# Patient Record
Sex: FEMALE | Race: ASIAN | NOT HISPANIC OR LATINO | URBAN - METROPOLITAN AREA
[De-identification: names, ages, dates, MRNs, and addresses within clinical notes are randomized per-mention and may not be internally consistent; named-entity substitution may affect disease eponyms.]

---

## 2023-08-21 VITALS
OXYGEN SATURATION: 100 % | WEIGHT: 110.01 LBS | SYSTOLIC BLOOD PRESSURE: 163 MMHG | HEART RATE: 63 BPM | TEMPERATURE: 97 F | RESPIRATION RATE: 16 BRPM | DIASTOLIC BLOOD PRESSURE: 92 MMHG | HEIGHT: 59 IN

## 2023-08-21 RX ORDER — CHLORHEXIDINE GLUCONATE 213 G/1000ML
1 SOLUTION TOPICAL ONCE
Refills: 0 | Status: DISCONTINUED | OUTPATIENT
Start: 2023-08-28 | End: 2023-08-29

## 2023-08-21 NOTE — H&P ADULT - ASSESSMENT
74 y/o Mandarin speaking  female with PMH of HTN, HLD , b/l varicose veins, arthritis, sciatica who presents to see her cardiologist Dr. Jesus with CC of substernal, non-radiating CP described as pressure like which has been gradually worsening occurring intermittently for the past few weeks occuring on ambulating less than 2 blocks or climbing greater than 1 flight of stairs which is worse with exertion and relieved with rest.  Pt also with gradually worsening dizziness; reports several episodes of near syncope. Patient now presents to [facility] for possible cardiac cath with possible intervention if clinically in light of patients risk factors, CCS class II/III sx, and abnormal NST.     ASA II          Mallampati I    Patient is a candidate for sedation: yes  Sedation: Moderate    Risks & benefits of procedure and alternative therapy have been explained to the patient including but not limited to: allergic reaction, bleeding w/possible need for blood transfusion, infection, renal and vascular compromise, limb damage, arrhythmia, stroke, vessel dissection/perforation, Myocardial infarction, emergent CABG. Informed consent obtained and in chart.   Consent obtained with Mandarin  Language Line #[id number] and children at bedside.     Patient denies bleeding, BRBPR, melena, hematuria, hematemesis.    [ ] Load w/ Plavix 600mg POx1, patient compliant with ASA 81mg and took dose this AM prior to arrival   - H/H 12.5/38.3 Plt 224   [ ] Hydrate with  cc Bolus IV x 1 followed by NS 75 cc/hr x 2 hrs per Hydration Protocol   - BUN/Cr 11/0.47   - Patient euvolemic on exam. EF 60%, mild AI/TR mod AR- will caution and give 250cc bolus over 1hour   [ ] K 4.4 Mg 2.1   - lytes stable   [ ] Patient allergic to lidocaine, unable to verify reaction. Fellow made aware and will proceed with bupivacaine; ordered on call to cath lab

## 2023-08-21 NOTE — H&P ADULT - NSICDXPASTMEDICALHX_GEN_ALL_CORE_FT
PAST MEDICAL HISTORY:  HLD (hyperlipidemia)     HTN (hypertension)     Sciatica     Varicose veins

## 2023-08-21 NOTE — H&P ADULT - HISTORY OF PRESENT ILLNESS
Cardiologist: Dr. Jesus  Pharmacy:  Escort:    propofol allergy (severe __ confirm reaction)    72 y/o M mandarin Speaking with PMH of HTN, HLD , b/l varicose veins, arthritis, sciatica who presents to see his cardiologist Dr. Jesus with CC of substernal, non-radiating CP described as pressure like which has been gradually worsening occurring intermittently for the past few weeks occuring on ambulating less than 2 blocks or climbing greater than 1 flight of stairs which is worse with exertion and relieved with rest.  Pt also with gradually worsening dizziness; reports several episodes of near syncope. Pt also with b/l moderate leg pain described as heaviness/weakness occuring on ambulating less than 1 block which is improved with rest and avoidance of prolonged standing.  Pt. denies any ___ SOB,  palpitation, N/V, LE edema, PND/orthopnea.  NST 6/23/23 revealed mod. perfusion abnormality of mod. intensity os present in lateral region on stress image which was reversible in rest images, EF 70 %,  ECHO 6/16/23 revealed EF 60 % , mild AI. mod MR, mild TR, left to right shunt noted across the interatrial septum suggestive of trace patent foramen ovale.     In light of patients risk factors, CCS class __ sx and abnormal NST, pt, is now referred for cardiac cath with possible intervention.  Cardiologist: Dr. Jesus  Pharmacy: [facility] ([phone])  Escort: Children     72 y/o Mandarin speaking female with PMH of HTN, HLD , b/l varicose veins, arthritis, sciatica who presents to see her cardiologist Dr. Jesus with CC of substernal, non-radiating CP described as pressure like which has been gradually worsening occurring intermittently for the past few weeks occuring on ambulating less than 2 blocks or climbing greater than 1 flight of stairs which is worse with exertion and relieved with rest.  Pt also with gradually worsening dizziness; reports several episodes of near syncope. Pt also with b/l moderate leg pain described as heaviness/weakness occuring on ambulating less than 1 block which is improved with rest and avoidance of prolonged standing.  Pt endorses intermittent SOB, palpitations, and diarrhea. Otherwise denies abdominal pain, N/V, LE edema, PND/orthopnea, hematuria, BRBPR, or melena at this time. NST 6/23/23 revealed mod. perfusion abnormality of mod. intensity os present in lateral region on stress image which was reversible in rest images, EF 70 %,  ECHO 6/16/23 revealed EF 60 % , mild AI. mod MR, mild TR, left to right shunt noted across the interatrial septum suggestive of trace patent foramen ovale.     Of note, per MD records patient allergic to  propofol- patient states after colonoscopy it took a long time to wake up and she was shaking her head, denies rash, itchiness, or SOB. Per outpatient MD notes patient also allergic to lidocaine unknown reaction     In light of patients risk factors, CCS class II/III sx and abnormal NST, pt, is now referred for cardiac cath with possible intervention if clinically indicated.   Cardiologist: Dr. Jesus  Pharmacy: [facility] ([phone])  Med Rec done with pharmacy- patient prescribed Atorva 20mg qhs and Vascepa 1g 2caps bid however patient is not taking.   Escort: Children     74 y/o Mandarin speaking female with PMH of HTN, HLD , b/l varicose veins, arthritis, sciatica who presents to see her cardiologist Dr. Jesus with CC of substernal, non-radiating CP described as pressure like which has been gradually worsening occurring intermittently for the past few weeks occuring on ambulating less than 2 blocks or climbing greater than 1 flight of stairs which is worse with exertion and relieved with rest.  Pt also with gradually worsening dizziness; reports several episodes of near syncope. Pt also with b/l moderate leg pain described as heaviness/weakness occuring on ambulating less than 1 block which is improved with rest and avoidance of prolonged standing.  Pt endorses intermittent SOB, palpitations, and diarrhea. Otherwise denies abdominal pain, N/V, LE edema, PND/orthopnea, hematuria, BRBPR, or melena at this time. NST 6/23/23 revealed mod. perfusion abnormality of mod. intensity os present in lateral region on stress image which was reversible in rest images, EF 70 %,  ECHO 6/16/23 revealed EF 60 % , mild AI. mod MR, mild TR, left to right shunt noted across the interatrial septum suggestive of trace patent foramen ovale.     Of note, per MD records patient allergic to  propofol- patient states after colonoscopy it took a long time to wake up and she was shaking her head, denies rash, itchiness, or SOB. Per outpatient MD notes patient also allergic to lidocaine unknown reaction     In light of patients risk factors, CCS class II/III sx and abnormal NST, pt, is now referred for cardiac cath with possible intervention if clinically indicated.

## 2023-08-21 NOTE — H&P ADULT - NSHPLABSRESULTS_GEN_ALL_CORE
12.5   4.76  )-----------( 224      ( 28 Aug 2023 12:26 )             38.3       08-28    145  |  108  |  11  ----------------------------<  94  4.4   |  28  |  0.47<L>    Ca    9.1      28 Aug 2023 12:32  Mg     2.1     08-28    TPro  7.6  /  Alb  4.5  /  TBili  0.3  /  DBili  x   /  AST  25  /  ALT  27  /  AlkPhos  52  08-28      PT/INR - ( 28 Aug 2023 12:26 )   PT: 10.7 sec;   INR: 0.94          PTT - ( 28 Aug 2023 12:26 )  PTT:29.5 sec        EKG 08/11/23: 63 BPM NSR no acute ischemic changes

## 2023-08-28 ENCOUNTER — INPATIENT (INPATIENT)
Facility: HOSPITAL | Age: 73
LOS: 0 days | Discharge: HOME | DRG: 287 | End: 2023-08-29
Attending: INTERNAL MEDICINE | Admitting: INTERNAL MEDICINE
Payer: MEDICARE

## 2023-08-28 LAB
A1C WITH ESTIMATED AVERAGE GLUCOSE RESULT: 5.7 % — HIGH (ref 4–5.6)
ALBUMIN SERPL ELPH-MCNC: 4.1 G/DL — SIGNIFICANT CHANGE UP (ref 3.3–5)
ALBUMIN SERPL ELPH-MCNC: 4.5 G/DL — SIGNIFICANT CHANGE UP (ref 3.3–5)
ALP SERPL-CCNC: 48 U/L — SIGNIFICANT CHANGE UP (ref 40–120)
ALP SERPL-CCNC: 52 U/L — SIGNIFICANT CHANGE UP (ref 40–120)
ALT FLD-CCNC: 26 U/L — SIGNIFICANT CHANGE UP (ref 10–45)
ALT FLD-CCNC: 27 U/L — SIGNIFICANT CHANGE UP (ref 10–45)
ANION GAP SERPL CALC-SCNC: 10 MMOL/L — SIGNIFICANT CHANGE UP (ref 5–17)
ANION GAP SERPL CALC-SCNC: 9 MMOL/L — SIGNIFICANT CHANGE UP (ref 5–17)
APTT BLD: 100.5 SEC — HIGH (ref 24.5–35.6)
APTT BLD: 29.5 SEC — SIGNIFICANT CHANGE UP (ref 24.5–35.6)
AST SERPL-CCNC: 23 U/L — SIGNIFICANT CHANGE UP (ref 10–40)
AST SERPL-CCNC: 25 U/L — SIGNIFICANT CHANGE UP (ref 10–40)
BASE EXCESS BLDV CALC-SCNC: 3.5 MMOL/L — HIGH (ref -2–3)
BASOPHILS # BLD AUTO: 0.01 K/UL — SIGNIFICANT CHANGE UP (ref 0–0.2)
BASOPHILS # BLD AUTO: 0.02 K/UL — SIGNIFICANT CHANGE UP (ref 0–0.2)
BASOPHILS NFR BLD AUTO: 0.2 % — SIGNIFICANT CHANGE UP (ref 0–2)
BASOPHILS NFR BLD AUTO: 0.4 % — SIGNIFICANT CHANGE UP (ref 0–2)
BILIRUB SERPL-MCNC: 0.3 MG/DL — SIGNIFICANT CHANGE UP (ref 0.2–1.2)
BILIRUB SERPL-MCNC: 0.4 MG/DL — SIGNIFICANT CHANGE UP (ref 0.2–1.2)
BLOOD GAS VENOUS - BLOOD UREA NITROGEN: 12 MG/DL — SIGNIFICANT CHANGE UP (ref 7–23)
BLOOD GAS VENOUS - CREATININE: 0.5 MG/DL — SIGNIFICANT CHANGE UP (ref 0.5–1.3)
BUN SERPL-MCNC: 10 MG/DL — SIGNIFICANT CHANGE UP (ref 7–23)
BUN SERPL-MCNC: 11 MG/DL — SIGNIFICANT CHANGE UP (ref 7–23)
CA-I SERPL-SCNC: 1.23 MMOL/L — SIGNIFICANT CHANGE UP (ref 1.15–1.33)
CALCIUM SERPL-MCNC: 9 MG/DL — SIGNIFICANT CHANGE UP (ref 8.4–10.5)
CALCIUM SERPL-MCNC: 9.1 MG/DL — SIGNIFICANT CHANGE UP (ref 8.4–10.5)
CHLORIDE SERPL-SCNC: 108 MMOL/L — SIGNIFICANT CHANGE UP (ref 96–108)
CHLORIDE SERPL-SCNC: 108 MMOL/L — SIGNIFICANT CHANGE UP (ref 96–108)
CHOLEST SERPL-MCNC: 166 MG/DL — SIGNIFICANT CHANGE UP
CO2 BLDV-SCNC: 29.9 MMOL/L — HIGH (ref 22–26)
CO2 SERPL-SCNC: 24 MMOL/L — SIGNIFICANT CHANGE UP (ref 22–31)
CO2 SERPL-SCNC: 28 MMOL/L — SIGNIFICANT CHANGE UP (ref 22–31)
COHGB MFR BLDV: 0 % — SIGNIFICANT CHANGE UP
CREAT SERPL-MCNC: 0.44 MG/DL — LOW (ref 0.5–1.3)
CREAT SERPL-MCNC: 0.47 MG/DL — LOW (ref 0.5–1.3)
EGFR: 102 ML/MIN/1.73M2 — SIGNIFICANT CHANGE UP
EGFR: 103 ML/MIN/1.73M2 — SIGNIFICANT CHANGE UP
EOSINOPHIL # BLD AUTO: 0.02 K/UL — SIGNIFICANT CHANGE UP (ref 0–0.5)
EOSINOPHIL # BLD AUTO: 0.03 K/UL — SIGNIFICANT CHANGE UP (ref 0–0.5)
EOSINOPHIL NFR BLD AUTO: 0.4 % — SIGNIFICANT CHANGE UP (ref 0–6)
EOSINOPHIL NFR BLD AUTO: 0.6 % — SIGNIFICANT CHANGE UP (ref 0–6)
ESTIMATED AVERAGE GLUCOSE: 117 MG/DL — HIGH (ref 68–114)
GAS PNL BLDV: 140 MMOL/L — SIGNIFICANT CHANGE UP (ref 136–145)
GLUCOSE BLDC GLUCOMTR-MCNC: 89 MG/DL — SIGNIFICANT CHANGE UP (ref 70–99)
GLUCOSE BLDC GLUCOMTR-MCNC: 93 MG/DL — SIGNIFICANT CHANGE UP (ref 70–99)
GLUCOSE BLDV-MCNC: 92 MG/DL — SIGNIFICANT CHANGE UP (ref 70–99)
GLUCOSE SERPL-MCNC: 104 MG/DL — HIGH (ref 70–99)
GLUCOSE SERPL-MCNC: 94 MG/DL — SIGNIFICANT CHANGE UP (ref 70–99)
HCO3 BLDV-SCNC: 28 MMOL/L — SIGNIFICANT CHANGE UP (ref 22–29)
HCT VFR BLD CALC: 35.6 % — SIGNIFICANT CHANGE UP (ref 34.5–45)
HCT VFR BLD CALC: 38.3 % — SIGNIFICANT CHANGE UP (ref 34.5–45)
HCT VFR BLDA CALC: 38 % — SIGNIFICANT CHANGE UP
HCT VFR BLDA CALC: 38 % — SIGNIFICANT CHANGE UP
HDLC SERPL-MCNC: 59 MG/DL — SIGNIFICANT CHANGE UP
HGB BLD CALC-MCNC: 12.6 G/DL — SIGNIFICANT CHANGE UP (ref 11.7–16.1)
HGB BLD-MCNC: 11.8 G/DL — SIGNIFICANT CHANGE UP (ref 11.5–15.5)
HGB BLD-MCNC: 12.5 G/DL — SIGNIFICANT CHANGE UP (ref 11.5–15.5)
IMM GRANULOCYTES NFR BLD AUTO: 0.2 % — SIGNIFICANT CHANGE UP (ref 0–0.9)
IMM GRANULOCYTES NFR BLD AUTO: 0.4 % — SIGNIFICANT CHANGE UP (ref 0–0.9)
INR BLD: 0.94 — SIGNIFICANT CHANGE UP (ref 0.85–1.18)
INR BLD: 0.96 — SIGNIFICANT CHANGE UP (ref 0.85–1.18)
ISTAT INR: 1 — SIGNIFICANT CHANGE UP (ref 0.88–1.16)
ISTAT PT: 11.5 SEC — SIGNIFICANT CHANGE UP (ref 10–12.9)
LACTATE SERPL-SCNC: 1 MMOL/L — SIGNIFICANT CHANGE UP (ref 0.5–2)
LIPID PNL WITH DIRECT LDL SERPL: 87 MG/DL — SIGNIFICANT CHANGE UP
LYMPHOCYTES # BLD AUTO: 1.67 K/UL — SIGNIFICANT CHANGE UP (ref 1–3.3)
LYMPHOCYTES # BLD AUTO: 1.79 K/UL — SIGNIFICANT CHANGE UP (ref 1–3.3)
LYMPHOCYTES # BLD AUTO: 33 % — SIGNIFICANT CHANGE UP (ref 13–44)
LYMPHOCYTES # BLD AUTO: 37.6 % — SIGNIFICANT CHANGE UP (ref 13–44)
MAGNESIUM SERPL-MCNC: 2 MG/DL — SIGNIFICANT CHANGE UP (ref 1.6–2.6)
MAGNESIUM SERPL-MCNC: 2.1 MG/DL — SIGNIFICANT CHANGE UP (ref 1.6–2.6)
MCHC RBC-ENTMCNC: 31.7 PG — SIGNIFICANT CHANGE UP (ref 27–34)
MCHC RBC-ENTMCNC: 31.8 PG — SIGNIFICANT CHANGE UP (ref 27–34)
MCHC RBC-ENTMCNC: 32.6 GM/DL — SIGNIFICANT CHANGE UP (ref 32–36)
MCHC RBC-ENTMCNC: 33.1 GM/DL — SIGNIFICANT CHANGE UP (ref 32–36)
MCV RBC AUTO: 96 FL — SIGNIFICANT CHANGE UP (ref 80–100)
MCV RBC AUTO: 97.2 FL — SIGNIFICANT CHANGE UP (ref 80–100)
METHGB MFR BLDV: 0.3 % — SIGNIFICANT CHANGE UP
MONOCYTES # BLD AUTO: 0.33 K/UL — SIGNIFICANT CHANGE UP (ref 0–0.9)
MONOCYTES # BLD AUTO: 0.4 K/UL — SIGNIFICANT CHANGE UP (ref 0–0.9)
MONOCYTES NFR BLD AUTO: 6.5 % — SIGNIFICANT CHANGE UP (ref 2–14)
MONOCYTES NFR BLD AUTO: 8.4 % — SIGNIFICANT CHANGE UP (ref 2–14)
NEUTROPHILS # BLD AUTO: 2.51 K/UL — SIGNIFICANT CHANGE UP (ref 1.8–7.4)
NEUTROPHILS # BLD AUTO: 3.01 K/UL — SIGNIFICANT CHANGE UP (ref 1.8–7.4)
NEUTROPHILS NFR BLD AUTO: 52.8 % — SIGNIFICANT CHANGE UP (ref 43–77)
NEUTROPHILS NFR BLD AUTO: 59.5 % — SIGNIFICANT CHANGE UP (ref 43–77)
NON HDL CHOLESTEROL: 107 MG/DL — SIGNIFICANT CHANGE UP
NRBC # BLD: 0 /100 WBCS — SIGNIFICANT CHANGE UP (ref 0–0)
NRBC # BLD: 0 /100 WBCS — SIGNIFICANT CHANGE UP (ref 0–0)
PCO2 BLDV: 44 MMHG — SIGNIFICANT CHANGE UP (ref 39–52)
PH BLDV: 7.42 — SIGNIFICANT CHANGE UP (ref 7.32–7.43)
PLATELET # BLD AUTO: 216 K/UL — SIGNIFICANT CHANGE UP (ref 150–400)
PLATELET # BLD AUTO: 224 K/UL — SIGNIFICANT CHANGE UP (ref 150–400)
PO2 BLDV: <33 MMHG — SIGNIFICANT CHANGE UP (ref 25–45)
POTASSIUM BLDV-SCNC: 4.1 MMOL/L — SIGNIFICANT CHANGE UP (ref 3.5–5.1)
POTASSIUM SERPL-MCNC: 3.4 MMOL/L — LOW (ref 3.5–5.3)
POTASSIUM SERPL-MCNC: 4.4 MMOL/L — SIGNIFICANT CHANGE UP (ref 3.5–5.3)
POTASSIUM SERPL-SCNC: 3.4 MMOL/L — LOW (ref 3.5–5.3)
POTASSIUM SERPL-SCNC: 4.4 MMOL/L — SIGNIFICANT CHANGE UP (ref 3.5–5.3)
PROCALCITONIN SERPL-MCNC: 0.03 NG/ML — SIGNIFICANT CHANGE UP (ref 0.02–0.1)
PROT SERPL-MCNC: 7.1 G/DL — SIGNIFICANT CHANGE UP (ref 6–8.3)
PROT SERPL-MCNC: 7.6 G/DL — SIGNIFICANT CHANGE UP (ref 6–8.3)
PROTHROM AB SERPL-ACNC: 10.7 SEC — SIGNIFICANT CHANGE UP (ref 9.5–13)
PROTHROM AB SERPL-ACNC: 11 SEC — SIGNIFICANT CHANGE UP (ref 9.5–13)
RBC # BLD: 3.71 M/UL — LOW (ref 3.8–5.2)
RBC # BLD: 3.94 M/UL — SIGNIFICANT CHANGE UP (ref 3.8–5.2)
RBC # FLD: 12.1 % — SIGNIFICANT CHANGE UP (ref 10.3–14.5)
RBC # FLD: 12.2 % — SIGNIFICANT CHANGE UP (ref 10.3–14.5)
SAO2 % BLDV: 45 % — LOW (ref 67–88)
SODIUM SERPL-SCNC: 142 MMOL/L — SIGNIFICANT CHANGE UP (ref 135–145)
SODIUM SERPL-SCNC: 145 MMOL/L — SIGNIFICANT CHANGE UP (ref 135–145)
TRIGL SERPL-MCNC: 101 MG/DL — SIGNIFICANT CHANGE UP
WBC # BLD: 4.76 K/UL — SIGNIFICANT CHANGE UP (ref 3.8–10.5)
WBC # BLD: 5.06 K/UL — SIGNIFICANT CHANGE UP (ref 3.8–10.5)
WBC # FLD AUTO: 4.76 K/UL — SIGNIFICANT CHANGE UP (ref 3.8–10.5)
WBC # FLD AUTO: 5.06 K/UL — SIGNIFICANT CHANGE UP (ref 3.8–10.5)

## 2023-08-28 PROCEDURE — 93458 L HRT ARTERY/VENTRICLE ANGIO: CPT | Mod: 26

## 2023-08-28 RX ORDER — RANOLAZINE 500 MG/1
500 TABLET, FILM COATED, EXTENDED RELEASE ORAL
Refills: 0 | Status: DISCONTINUED | OUTPATIENT
Start: 2023-08-28 | End: 2023-08-29

## 2023-08-28 RX ORDER — HYDRALAZINE HCL 50 MG
25 TABLET ORAL ONCE
Refills: 0 | Status: DISCONTINUED | OUTPATIENT
Start: 2023-08-28 | End: 2023-08-28

## 2023-08-28 RX ORDER — HYDRALAZINE HCL 50 MG
10 TABLET ORAL ONCE
Refills: 0 | Status: DISCONTINUED | OUTPATIENT
Start: 2023-08-28 | End: 2023-08-28

## 2023-08-28 RX ORDER — SODIUM CHLORIDE 9 MG/ML
250 INJECTION INTRAMUSCULAR; INTRAVENOUS; SUBCUTANEOUS ONCE
Refills: 0 | Status: COMPLETED | OUTPATIENT
Start: 2023-08-28 | End: 2023-08-28

## 2023-08-28 RX ORDER — LOSARTAN POTASSIUM 100 MG/1
1 TABLET, FILM COATED ORAL
Refills: 0 | DISCHARGE

## 2023-08-28 RX ORDER — POTASSIUM CHLORIDE 20 MEQ
20 PACKET (EA) ORAL ONCE
Refills: 0 | Status: COMPLETED | OUTPATIENT
Start: 2023-08-28 | End: 2023-08-28

## 2023-08-28 RX ORDER — CLOPIDOGREL BISULFATE 75 MG/1
600 TABLET, FILM COATED ORAL ONCE
Refills: 0 | Status: COMPLETED | OUTPATIENT
Start: 2023-08-28 | End: 2023-08-28

## 2023-08-28 RX ORDER — LOSARTAN POTASSIUM 100 MG/1
25 TABLET, FILM COATED ORAL DAILY
Refills: 0 | Status: DISCONTINUED | OUTPATIENT
Start: 2023-08-29 | End: 2023-08-29

## 2023-08-28 RX ORDER — ATORVASTATIN CALCIUM 80 MG/1
20 TABLET, FILM COATED ORAL AT BEDTIME
Refills: 0 | Status: DISCONTINUED | OUTPATIENT
Start: 2023-08-28 | End: 2023-08-29

## 2023-08-28 RX ORDER — ATORVASTATIN CALCIUM 80 MG/1
1 TABLET, FILM COATED ORAL
Refills: 0 | DISCHARGE

## 2023-08-28 RX ORDER — BUPIVACAINE HCL/PF 7.5 MG/ML
25 VIAL (ML) INJECTION ONCE
Refills: 0 | Status: DISCONTINUED | OUTPATIENT
Start: 2023-08-28 | End: 2023-08-28

## 2023-08-28 RX ORDER — SODIUM CHLORIDE 9 MG/ML
1000 INJECTION INTRAMUSCULAR; INTRAVENOUS; SUBCUTANEOUS
Refills: 0 | Status: DISCONTINUED | OUTPATIENT
Start: 2023-08-28 | End: 2023-08-29

## 2023-08-28 RX ORDER — OMEPRAZOLE 10 MG/1
1 CAPSULE, DELAYED RELEASE ORAL
Refills: 0 | DISCHARGE

## 2023-08-28 RX ORDER — SODIUM CHLORIDE 9 MG/ML
500 INJECTION INTRAMUSCULAR; INTRAVENOUS; SUBCUTANEOUS
Refills: 0 | Status: DISCONTINUED | OUTPATIENT
Start: 2023-08-28 | End: 2023-08-28

## 2023-08-28 RX ORDER — MODAFINIL 200 MG/1
1 TABLET ORAL
Refills: 0 | DISCHARGE

## 2023-08-28 RX ORDER — ASPIRIN/CALCIUM CARB/MAGNESIUM 324 MG
81 TABLET ORAL DAILY
Refills: 0 | Status: DISCONTINUED | OUTPATIENT
Start: 2023-08-29 | End: 2023-08-29

## 2023-08-28 RX ORDER — NALOXONE HYDROCHLORIDE 4 MG/.1ML
0.4 SPRAY NASAL ONCE
Refills: 0 | Status: DISCONTINUED | OUTPATIENT
Start: 2023-08-28 | End: 2023-08-28

## 2023-08-28 RX ORDER — POTASSIUM CHLORIDE 20 MEQ
40 PACKET (EA) ORAL ONCE
Refills: 0 | Status: COMPLETED | OUTPATIENT
Start: 2023-08-28 | End: 2023-08-28

## 2023-08-28 RX ORDER — ICOSAPENT ETHYL 500 MG/1
2 CAPSULE, LIQUID FILLED ORAL
Refills: 0 | DISCHARGE

## 2023-08-28 RX ORDER — RANOLAZINE 500 MG/1
1 TABLET, FILM COATED, EXTENDED RELEASE ORAL
Refills: 0 | DISCHARGE

## 2023-08-28 RX ORDER — ASPIRIN/CALCIUM CARB/MAGNESIUM 324 MG
1 TABLET ORAL
Refills: 0 | DISCHARGE

## 2023-08-28 RX ADMIN — Medication 20 MILLIEQUIVALENT(S): at 21:29

## 2023-08-28 RX ADMIN — RANOLAZINE 500 MILLIGRAM(S): 500 TABLET, FILM COATED, EXTENDED RELEASE ORAL at 18:16

## 2023-08-28 RX ADMIN — SODIUM CHLORIDE 4.17 MILLILITER(S): 9 INJECTION INTRAMUSCULAR; INTRAVENOUS; SUBCUTANEOUS at 14:02

## 2023-08-28 RX ADMIN — SODIUM CHLORIDE 150 MILLILITER(S): 9 INJECTION INTRAMUSCULAR; INTRAVENOUS; SUBCUTANEOUS at 16:21

## 2023-08-28 RX ADMIN — SODIUM CHLORIDE 75 MILLILITER(S): 9 INJECTION INTRAMUSCULAR; INTRAVENOUS; SUBCUTANEOUS at 14:02

## 2023-08-28 RX ADMIN — Medication 40 MILLIEQUIVALENT(S): at 21:29

## 2023-08-28 RX ADMIN — ATORVASTATIN CALCIUM 20 MILLIGRAM(S): 80 TABLET, FILM COATED ORAL at 21:29

## 2023-08-28 RX ADMIN — CLOPIDOGREL BISULFATE 600 MILLIGRAM(S): 75 TABLET, FILM COATED ORAL at 14:01

## 2023-08-28 NOTE — CONSULT NOTE ADULT - ASSESSMENT
Ms Lehman is a 73 year old female who is admitted for Cornonary angiogram is being evaluated for episode of unresponsiveness following sedation for procedure. She is drowsy but otherwise neurological exam is non focal. H/O questionable seizure?. Her current presentation more likely to be post-procedural ( oversensitivity to sedation), but need to rule out seizure.    Recommendations:  - Obtain Video EEG.  - No need for anti-epileptic now.  - Hold off Modafinil for now.  - Seizure precaution  - Epilepsy team will follow.    Discussed with Epilepsy attending.  Ms Lehman is a 73 year old female who is admitted for Cornonary angiogram is being evaluated for episode of unresponsiveness following sedation for procedure. She is drowsy but otherwise neurological exam is non focal. H/O questionable seizure?. Her current presentation more likely to be post-procedural ( oversensitivity to sedation), but need to rule out seizure.    Recommendations:  - Obtain Video EEG.  - No need for anti-epileptic now.  - Hold off Modafinil for now.  - Seizure precaution  - Epilepsy team will follow.  - Asked family to find out the place where the EEG was done.    Discussed with Epilepsy attending.

## 2023-08-28 NOTE — CHART NOTE - NSCHARTNOTEFT_GEN_A_CORE
PA called to bedside as patient unresponsive post procedure. Pt s/p diagnostic LHC revealing non-obstructive CAD and mild mLAD myocardial bridge. Procedure via R Radial artery. On arrival patient unresponsive, extremities flaccid, pt not responding to auditory or painful stimuli. HR 70s, SBP ~200, normal SPO2, and blood sugar 93. Rapid response and stroke code called at this time. Family at bedside stating that she has done this before after receiving sedation and becomes unresponsive. Interventional cardiologist attending Dr. Knight at bedside and evaluating patient. Stroke code cancelled given presentation more suspicious of seizure. After less than 10min patient came to and slowly regained baseline mental status, and now responding to verbal commands appropriately and conversing with medical staff.     Further interview of family revealed patient has seen neurologist in the past (9/2022) for concern for seizure (Dr. Primo Whitman), and was supposed to start taking Lamotrigine 25mg PO BID; however, patient never started this medication.     Plan for patient to be admitted to [facility] and epilepsy team consulted for further evaluation of seizures.

## 2023-08-28 NOTE — CONSULT NOTE ADULT - SUBJECTIVE AND OBJECTIVE BOX
# [id number]  HPI:  74 y/o Mandarin speaking female with PMH of HTN, HLD , b/l varicose veins, arthritis, sciatica who came to hospital for Coronary angiogram in view of chest pain and  CCS class II/III sx and abnormal NST. She underwent Coronary angiogram under Midazolam and Fentanyl, showed no obstruction, no intervention was performed. Following the procedure , she became unresponsive in the post op area. She started to regain consciousness in less than 10 minutes. No shaking, tongue biting, head turning, drooling, incontinence noted.   Per family, patient had similar episodes multiple times in the past specially when she is exhausted. She was seen in a facility where she had CT scan and possibly EEG done, and they were told the results were "normal". She was then seen by a Neurologist Dr. Mata, who recommended to start Lamotrigine. She took few doses and then stopped it. She was also prescribed Modafinil by her PCP. Denies any seizure in childhood. Reports remote H/O head trauma. No family H/O seizure.   per MD records patient allergic to  propofol- patient states after colonoscopy it took a long time to wake up and she was shaking her head, denies rash, itchiness, or SOB. Per outpatient MD notes patient also allergic to lidocaine unknown reaction         PAST MEDICAL & SURGICAL HISTORY:  HTN (hypertension)    HLD (hyperlipidemia)    Sciatica    Varicose veins          Medications:  BUpivacaine 0.25% Injectable 25 milliLiter(s) Local Injection once  chlorhexidine 4% Liquid 1 Application(s) Topical once  sodium chloride 0.9%. 1000 milliLiter(s) IV Continuous <Continuous>            Neurological Exam:   Mental status: Drowsy, alert and oriented x3.  Naming, repetition and comprehension intact.  Attention/concentration intact.  No dysarthria, no aphasia.   Cranial nerves: Pupils equally round and reactive to light, visual fields full, no nystagmus, extraocular muscles intact, V1 through V3 intact bilaterally and symmetric, face symmetric,   Motor:  MRC grading 5/5 b/l UE/LE.   strength 5/5.  Normal tone and bulk.  No abnormal movements.    Sensation: Intact to light touch,no neglect.  Coordination: No dysmetria on finger-to-nose   Reflexes: 2+ in bilateral UE/LE, Planter: equivocal  Gait:Deferred    Labs:  CBC Full  -  ( 28 Aug 2023 15:48 )  WBC Count : 5.06 K/uL  RBC Count : 3.71 M/uL  Hemoglobin : 11.8 g/dL  Hematocrit : 35.6 %  Platelet Count - Automated : 216 K/uL  Mean Cell Volume : 96.0 fl  Mean Cell Hemoglobin : 31.8 pg  Mean Cell Hemoglobin Concentration : 33.1 gm/dL  Auto Neutrophil # : 3.01 K/uL  Auto Lymphocyte # : 1.67 K/uL  Auto Monocyte # : 0.33 K/uL  Auto Eosinophil # : 0.02 K/uL  Auto Basophil # : 0.02 K/uL  Auto Neutrophil % : 59.5 %  Auto Lymphocyte % : 33.0 %  Auto Monocyte % : 6.5 %  Auto Eosinophil % : 0.4 %  Auto Basophil % : 0.4 %    08-28    142  |  108  |  10  ----------------------------<  104<H>  3.4<L>   |  24  |  0.44<L>    Ca    9.0      28 Aug 2023 15:48  Mg     2.0     08-28    TPro  7.1  /  Alb  4.1  /  TBili  0.4  /  DBili  x   /  AST  23  /  ALT  26  /  AlkPhos  48  08-28    LIVER FUNCTIONS - ( 28 Aug 2023 15:48 )  Alb: 4.1 g/dL / Pro: 7.1 g/dL / ALK PHOS: 48 U/L / ALT: 26 U/L / AST: 23 U/L / GGT: x           PT/INR - ( 28 Aug 2023 12:26 )   PT: 10.7 sec;   INR: 0.94          PTT - ( 28 Aug 2023 12:26 )  PTT:29.5 sec  Urinalysis Basic - ( 28 Aug 2023 15:48 )    Color: x / Appearance: x / SG: x / pH: x  Gluc: 104 mg/dL / Ketone: x  / Bili: x / Urobili: x   Blood: x / Protein: x / Nitrite: x   Leuk Esterase: x / RBC: x / WBC x   Sq Epi: x / Non Sq Epi: x / Bacteria: x

## 2023-08-28 NOTE — PATIENT PROFILE ADULT - FALL HARM RISK - RISK INTERVENTIONS

## 2023-08-29 VITALS
OXYGEN SATURATION: 98 % | HEART RATE: 66 BPM | RESPIRATION RATE: 18 BRPM | DIASTOLIC BLOOD PRESSURE: 65 MMHG | SYSTOLIC BLOOD PRESSURE: 136 MMHG

## 2023-08-29 LAB
ALBUMIN SERPL ELPH-MCNC: 3.6 G/DL — SIGNIFICANT CHANGE UP (ref 3.3–5)
ALP SERPL-CCNC: 39 U/L — LOW (ref 40–120)
ALT FLD-CCNC: 23 U/L — SIGNIFICANT CHANGE UP (ref 10–45)
ANION GAP SERPL CALC-SCNC: 7 MMOL/L — SIGNIFICANT CHANGE UP (ref 5–17)
AST SERPL-CCNC: 20 U/L — SIGNIFICANT CHANGE UP (ref 10–40)
BILIRUB SERPL-MCNC: 0.4 MG/DL — SIGNIFICANT CHANGE UP (ref 0.2–1.2)
BUN SERPL-MCNC: 11 MG/DL — SIGNIFICANT CHANGE UP (ref 7–23)
CALCIUM SERPL-MCNC: 8.5 MG/DL — SIGNIFICANT CHANGE UP (ref 8.4–10.5)
CHLORIDE SERPL-SCNC: 110 MMOL/L — HIGH (ref 96–108)
CO2 SERPL-SCNC: 22 MMOL/L — SIGNIFICANT CHANGE UP (ref 22–31)
CREAT SERPL-MCNC: 0.53 MG/DL — SIGNIFICANT CHANGE UP (ref 0.5–1.3)
EGFR: 99 ML/MIN/1.73M2 — SIGNIFICANT CHANGE UP
GLUCOSE SERPL-MCNC: 89 MG/DL — SIGNIFICANT CHANGE UP (ref 70–99)
HCT VFR BLD CALC: 34.7 % — SIGNIFICANT CHANGE UP (ref 34.5–45)
HCV AB S/CO SERPL IA: 0.04 S/CO — SIGNIFICANT CHANGE UP
HCV AB SERPL-IMP: SIGNIFICANT CHANGE UP
HGB BLD-MCNC: 11.1 G/DL — LOW (ref 11.5–15.5)
MAGNESIUM SERPL-MCNC: 2.2 MG/DL — SIGNIFICANT CHANGE UP (ref 1.6–2.6)
MCHC RBC-ENTMCNC: 31.6 PG — SIGNIFICANT CHANGE UP (ref 27–34)
MCHC RBC-ENTMCNC: 32 GM/DL — SIGNIFICANT CHANGE UP (ref 32–36)
MCV RBC AUTO: 98.9 FL — SIGNIFICANT CHANGE UP (ref 80–100)
NRBC # BLD: 0 /100 WBCS — SIGNIFICANT CHANGE UP (ref 0–0)
PLATELET # BLD AUTO: 203 K/UL — SIGNIFICANT CHANGE UP (ref 150–400)
POTASSIUM SERPL-MCNC: 4.5 MMOL/L — SIGNIFICANT CHANGE UP (ref 3.5–5.3)
POTASSIUM SERPL-SCNC: 4.5 MMOL/L — SIGNIFICANT CHANGE UP (ref 3.5–5.3)
PROLACTIN SERPL-MCNC: 5.2 NG/ML — SIGNIFICANT CHANGE UP (ref 3.4–24.1)
PROT SERPL-MCNC: 6.1 G/DL — SIGNIFICANT CHANGE UP (ref 6–8.3)
RBC # BLD: 3.51 M/UL — LOW (ref 3.8–5.2)
RBC # FLD: 12.4 % — SIGNIFICANT CHANGE UP (ref 10.3–14.5)
SODIUM SERPL-SCNC: 139 MMOL/L — SIGNIFICANT CHANGE UP (ref 135–145)
WBC # BLD: 4.28 K/UL — SIGNIFICANT CHANGE UP (ref 3.8–10.5)
WBC # FLD AUTO: 4.28 K/UL — SIGNIFICANT CHANGE UP (ref 3.8–10.5)

## 2023-08-29 PROCEDURE — C1894: CPT

## 2023-08-29 PROCEDURE — 99239 HOSP IP/OBS DSCHRG MGMT >30: CPT

## 2023-08-29 PROCEDURE — 84146 ASSAY OF PROLACTIN: CPT

## 2023-08-29 PROCEDURE — 83605 ASSAY OF LACTIC ACID: CPT

## 2023-08-29 PROCEDURE — 83735 ASSAY OF MAGNESIUM: CPT

## 2023-08-29 PROCEDURE — 82803 BLOOD GASES ANY COMBINATION: CPT

## 2023-08-29 PROCEDURE — 95700 EEG CONT REC W/VID EEG TECH: CPT

## 2023-08-29 PROCEDURE — 95705 EEG W/O VID 2-12 HR UNMNTR: CPT

## 2023-08-29 PROCEDURE — C1887: CPT

## 2023-08-29 PROCEDURE — 83036 HEMOGLOBIN GLYCOSYLATED A1C: CPT

## 2023-08-29 PROCEDURE — 99223 1ST HOSP IP/OBS HIGH 75: CPT

## 2023-08-29 PROCEDURE — C1769: CPT

## 2023-08-29 PROCEDURE — 95720 EEG PHY/QHP EA INCR W/VEEG: CPT

## 2023-08-29 PROCEDURE — 84145 PROCALCITONIN (PCT): CPT

## 2023-08-29 PROCEDURE — 80061 LIPID PANEL: CPT

## 2023-08-29 PROCEDURE — 85610 PROTHROMBIN TIME: CPT

## 2023-08-29 PROCEDURE — 36415 COLL VENOUS BLD VENIPUNCTURE: CPT

## 2023-08-29 PROCEDURE — 85025 COMPLETE CBC W/AUTO DIFF WBC: CPT

## 2023-08-29 PROCEDURE — 82962 GLUCOSE BLOOD TEST: CPT

## 2023-08-29 PROCEDURE — 85027 COMPLETE CBC AUTOMATED: CPT

## 2023-08-29 PROCEDURE — 85730 THROMBOPLASTIN TIME PARTIAL: CPT

## 2023-08-29 PROCEDURE — 86803 HEPATITIS C AB TEST: CPT

## 2023-08-29 PROCEDURE — 80053 COMPREHEN METABOLIC PANEL: CPT

## 2023-08-29 PROCEDURE — 93010 ELECTROCARDIOGRAM REPORT: CPT

## 2023-08-29 PROCEDURE — 93005 ELECTROCARDIOGRAM TRACING: CPT

## 2023-08-29 PROCEDURE — 95708 EEG WO VID EA 12-26HR UNMNTR: CPT

## 2023-08-29 PROCEDURE — 99222 1ST HOSP IP/OBS MODERATE 55: CPT

## 2023-08-29 RX ORDER — AMLODIPINE BESYLATE 2.5 MG/1
1 TABLET ORAL
Qty: 30 | Refills: 1
Start: 2023-08-29 | End: 2023-10-27

## 2023-08-29 RX ADMIN — Medication 81 MILLIGRAM(S): at 11:41

## 2023-08-29 RX ADMIN — LOSARTAN POTASSIUM 25 MILLIGRAM(S): 100 TABLET, FILM COATED ORAL at 09:18

## 2023-08-29 RX ADMIN — RANOLAZINE 500 MILLIGRAM(S): 500 TABLET, FILM COATED, EXTENDED RELEASE ORAL at 06:13

## 2023-08-29 NOTE — DISCHARGE NOTE PROVIDER - NSDCCPCAREPLAN_GEN_ALL_CORE_FT
PRINCIPAL DISCHARGE DIAGNOSIS  Diagnosis: Myocardial bridge  Assessment and Plan of Treatment: You had a cardiac catheterization which did not show any coronary artery disease. The procedure revealed a Myocardial Bridge of your mid Left Anterior Descending Artery (LAD).  Avoid strenuous activity or heavy lifting anything more than 5lbs for the next five days. Do not take a bath or swim for the next five days; you may shower. For any bleeding or hematoma formation (hardened blood collection under the skin) at the access site of your ____ please hold pressure and go to the emergency room. Please follow up with  ___ in 1-2 weeks. For recurrent chest pain, please call your doctor or go to the emergency room.       PRINCIPAL DISCHARGE DIAGNOSIS  Diagnosis: Myocardial bridge  Assessment and Plan of Treatment: You had a cardiac catheterization which did not show any coronary artery disease. The procedure revealed a Myocardial Bridge of your mid Left Anterior Descending Artery (LAD). Your coronary arteries feed the heart oxygen rich blood and normally sit on the surface of the heart. A myocardial bridge is a band of heart muscle that lies on top of a coronary artery, instead of underneath it. With a myocardial bridge, part of a coronary artery dips into and underneath the heart muscle and then comes back out again. This is a harmless anatomical anomaly and is something you have had since birth. To help with symptoms that may arise from the myocardial bridge, please start a mediction called AMLODIPINE 2.5mg ONCE A DAY.   Avoid strenuous activity or heavy lifting anything more than 5lbs for the next five days. Do not take a bath or swim for the next five days; you may shower. For any bleeding or hematoma formation (hardened blood collection under the skin) at the access site of your right wrist please hold pressure and go to the emergency room.   Please follow up with Dr. Jesus in 1-2 weeks. For recurrent chest pain, please call your doctor or go to the emergency room.        SECONDARY DISCHARGE DIAGNOSES  Diagnosis: Hypertension  Assessment and Plan of Treatment: Please continue Losartan 25mg as listed to keep your blood pressure controlled. For blood pressure that is too high or too low please see your doctor or go to the emergency room as necessary.      Diagnosis: Hyperlipidemia  Assessment and Plan of Treatment: Please continue Atorvastatin 20mg at bedtime to keep your cholesterol low. High cholesterol contributes to heart disease.      Diagnosis: History of adverse reaction to anesthesia  Assessment and Plan of Treatment: After your cardiac catheterization procedure, you were found to be unresponsive and not acting your at your normal baseline. Your providers were worried that you were having a stroke, however your family informed the providers that you have a histoy of adverse reaction after receving anesthesia and that you have had possible seizures in the past. You had a seizure workup done during your stay and the neurology team did some brain monitoring that did not show any seizure like activity. Please continue to take your MODAFINIL as prescribed.   When you follow up with Dr. Jesus, please ask him for a neurologist whom you can follow up with within the [facility] for futher workup.   If you exeperience seizure like activity at home, such as unresponsiveness, loss of consciousness with or without urine incontinence and jerking body movements, please call 911 and go to the ER for evaluation.

## 2023-08-29 NOTE — DISCHARGE NOTE PROVIDER - ATTENDING DISCHARGE PHYSICAL EXAMINATION:
74 y/o Mandarin speaking female with PMH of HTN, HLD , b/l varicose veins, arthritis, sciatica admitted for elective LHC.    1. CAD  S.P Coronary angiogram 8/28/23 no obstruction, no intervention was performed.    2. Altered mental status  s.p LHC she became unresponsive in the post op area. Neuro consult, CT negative, EEG negative for seizures. Follow up with neuro outpatient.

## 2023-08-29 NOTE — DISCHARGE NOTE PROVIDER - NSDCMRMEDTOKEN_GEN_ALL_CORE_FT
aspirin 81 mg oral tablet: 1 tab(s) orally once a day  Lipitor 20 mg oral tablet: 1 tab(s) orally once a day  losartan 25 mg oral tablet: 1 tab(s) orally once a day  modafinil 200 mg oral tablet: 1 tab(s) orally once a day  Ranexa 500 mg oral tablet, extended release: 1 tab(s) orally 2 times a day  Vascepa 1 g oral capsule: 2 orally 2 times a day   amLODIPine 2.5 mg oral tablet: 1 tab(s) orally once a day  aspirin 81 mg oral tablet: 1 tab(s) orally once a day  Lipitor 20 mg oral tablet: 1 tab(s) orally once a day  losartan 25 mg oral tablet: 1 tab(s) orally once a day  modafinil 200 mg oral tablet: 1 tab(s) orally once a day  Ranexa 500 mg oral tablet, extended release: 1 tab(s) orally 2 times a day  Vascepa 1 g oral capsule: 2 orally 2 times a day

## 2023-08-29 NOTE — DISCHARGE NOTE NURSING/CASE MANAGEMENT/SOCIAL WORK - PATIENT PORTAL LINK FT
You can access the FollowMyHealth Patient Portal offered by [facility] by registering at the following website: [email or web address]. By joining [facility]s FollowMyHealth portal, you will also be able to view your health information using other applications (apps) compatible with our system.

## 2023-08-29 NOTE — CONSULT NOTE ADULT - ASSESSMENT
74 y/o Mandarin speaking female with PMH of HTN, HLD , b/l varicose veins, arthritis, sciatica who presents to see her cardiologist Dr. Jesus with CC of substernal, non-radiating CP described as pressure like which has been gradually worsening occurring intermittently for the past few weeks occuring on ambulating less than 2 blocks or climbing greater than 1 flight of stairs which is worse with exertion and relieved with rest.  Pt also with gradually worsening dizziness; reports several episodes of near syncope. Pt also with b/l moderate leg pain described as heaviness/weakness occuring on ambulating less than 1 block which is improved with rest and avoidance of prolonged standing.  Pt endorses intermittent SOB, palpitations, and diarrhea. Otherwise denies abdominal pain, N/V, LE edema, PND/orthopnea, hematuria, BRBPR, or melena at this time. NST 6/23/23 revealed mod. perfusion abnormality of mod. intensity os present in lateral region on stress image which was reversible in rest images, EF 70 %,  ECHO 6/16/23 revealed EF 60 % , mild AI. mod MR, mild TR, left to right shunt noted across the interatrial septum suggestive of trace patent foramen ovale.     Of note, per MD records patient allergic to  propofol- patient states after colonoscopy it took a long time to wake up and she was shaking her head, denies rash, itchiness, or SOB. Per outpatient MD notes patient also allergic to lidocaine unknown reaction   non obstructive CAD -,Following the procedure , she became unresponsive in the post op area. She started to regain consciousness in less than 10 minutes. No shaking, tongue biting, head turning, drooling, incontinence noted.   Per family, patient had similar episodes multiple times in the past specially when she is exhausted. She was seen in a facility where she had CT scan and possibly EEG done, and they were told the results were "normal". She was then seen by a Neurologist Dr. Mata, who recommended to start Lamotrigine. She took few doses and then stopped it. She was also prescribed Modafinil by her PCP    - cardiac status stable.  - epilepsy team evaluated patient   - clinically remain stable, per epilepsy team -no anti-epileptic meds, to resume home med Modafinil   - non obstructive CAD- cw ASA, statin,   HTN- losartan    medically stable, poc dw cardiology.  thank you for allowing medicine to participate in the care.  pt to follow up with pmd, neurology and cardiology Dr. Jmain Jesus.

## 2023-08-29 NOTE — PROGRESS NOTE ADULT - ASSESSMENT
Ms. Lehman is a 73 year old female who is admitted for Cornonary angiogram is being evaluated for episode of unresponsiveness following sedation for procedure which reportedly happened after she got propofol for a colonoscopy. Family reports that the patient has been having recurrent spells at home in which she becomes unresponsive without any specific triggers, possibly worsened when she is tired. Pt underwent neurological workup (including CTH and EEG) in the past which was negative, and was started on modafinil as treatment for these spells. Patient was non-focal on exam and back to her baseline per hher family. vEEG recorded overnight and was unremarkable, post-op current presentation more likely to be post-procedural (oversensitivity to sedation).  Recommendations:  - No need for anti-epileptic medications.  - Re-start home Modafinil dose  - Recommend follow up with outpatient neurologist.   - Epilepsy team signing off.     Discussed with Epilepsy attending.

## 2023-08-29 NOTE — DISCHARGE NOTE PROVIDER - HOSPITAL COURSE
74 y/o Mandarin speaking female with PMH of HTN, HLD , b/l varicose veins, arthritis, sciatica who presents to see her cardiologist Dr. Jesus with CC of substernal, non-radiating CP described as pressure like which has been gradually worsening occurring intermittently for the past few weeks occuring on ambulating less than 2 blocks or climbing greater than 1 flight of stairs which is worse with exertion and relieved with rest.  Pt also with gradually worsening dizziness; reports several episodes of near syncope. Pt also with b/l moderate leg pain described as heaviness/weakness occuring on ambulating less than 1 block which is improved with rest and avoidance of prolonged standing.  Pt endorses intermittent SOB, palpitations, and diarrhea. NST 6/23/23 revealed mod. perfusion abnormality of mod. intensity os present in lateral region on stress image which was reversible in rest images, EF 70 %,  ECHO 6/16/23 revealed EF 60 % , mild AI. mod MR, mild TR, left to right shunt noted across the interatrial septum suggestive of trace patent foramen ovale. Of note, per MD records patient allergic to  propofol- patient states after colonoscopy it took a long time to wake up and she was shaking her head, denies rash, itchiness, or SOB. Per outpatient MD notes patient also allergic to lidocaine unknown reaction In light of patients risk factors, CCS class II/III sx and abnormal NST, pt, is now referred for cardiac cath with possible intervention if clinically indicated. PT underwent Coronary angiogram 8/28/23 under Midazolam and Fentanyl, showed no obstruction, no intervention was performed. Following the procedure , she became unresponsive in the post op area. She started to regain consciousness in less than 10 minutes. No shaking, tongue biting, head turning, drooling, incontinence noted. Per family, patient had similar episodes multiple times in the past specially when she is exhausted. She was seen in a facility where she had CT scan and possibly EEG done, and they were told the results were "normal". She was then seen by a Neurologist Dr. Mata, who recommended to start Lamotrigine. She took few doses and then stopped it. She was also prescribed Modafinil by her PCP. Denies any seizure in childhood. Reports remote H/O head trauma. No family H/O seizure. Per MD records patient allergic to  propofol- patient states after colonoscopy it took a long time to wake up and she was shaking her head, denies rash, itchiness, or SOB. Per outpatient MD notes patient also allergic to lidocaine unknown reaction. Stroke code was initially activated and later cancelled by Dr. Knight after evaluating patient and given presentation more suspicious of seizure vs stroke. Epilepsy team was consulted and rec vEEG monitor x24hrs, to hold Modafinil, and no anti-seizure meds to be administered at this time. Epilepsy team evaluated PT in the am and deemed PT ____________.     Pt is asymptomatic at this time and denies chest pain, SOB, NUNEZ, palpitations, dizziness, LOC, N/V, diaphoresis, orthopnea/PND, and leg swelling. Pt able to ambulate and void without complication. VSS. Labs and telemetry reviewed. ___________ access site stable and dressing C/D/I.  Pt is a candidate for discharge per . ________. Pt given appropriate discharge instructions, pt states they have an appropriate amount of their previous home meds unchanged from this visit at home, and any new medications were sent to their pharmacy. Pt instructed to f/u with ________ in 1-2 weeks.     72 y/o Mandarin speaking female with PMH of HTN, HLD , b/l varicose veins, arthritis, sciatica who presents to see her cardiologist Dr. Jesus with CC of substernal, non-radiating CP described as pressure like which has been gradually worsening occurring intermittently for the past few weeks occuring on ambulating less than 2 blocks or climbing greater than 1 flight of stairs which is worse with exertion and relieved with rest.  Pt also with gradually worsening dizziness; reports several episodes of near syncope. Pt also with b/l moderate leg pain described as heaviness/weakness occuring on ambulating less than 1 block which is improved with rest and avoidance of prolonged standing.  Pt endorses intermittent SOB, palpitations, and diarrhea. NST 6/23/23 revealed mod. perfusion abnormality of mod. intensity os present in lateral region on stress image which was reversible in rest images, EF 70 %,  ECHO 6/16/23 revealed EF 60 % , mild AI. mod MR, mild TR, left to right shunt noted across the interatrial septum suggestive of trace patent foramen ovale. Of note, per MD records patient allergic to  propofol- patient states after colonoscopy it took a long time to wake up and she was shaking her head, denies rash, itchiness, or SOB. Per outpatient MD notes patient also allergic to lidocaine unknown reaction In light of patients risk factors, CCS class II/III sx and abnormal NST, pt, is now referred for cardiac cath with possible intervention if clinically indicated. PT underwent Coronary angiogram 8/28/23 under Midazolam and Fentanyl, showed no obstruction, no intervention was performed. Following the procedure , she became unresponsive in the post op area. She started to regain consciousness in less than 10 minutes. No shaking, tongue biting, head turning, drooling, incontinence noted. Per family, patient had similar episodes multiple times in the past specially when she is exhausted. She was seen in a facility where she had CT scan and possibly EEG done, and they were told the results were "normal". She was then seen by a Neurologist Dr. Mata, who recommended to start Lamotrigine. She took few doses and then stopped it. She was also prescribed Modafinil by her PCP. Denies any seizure in childhood. Reports remote H/O head trauma. No family H/O seizure. Per MD records patient allergic to  propofol- patient states after colonoscopy it took a long time to wake up and she was shaking her head, denies rash, itchiness, or SOB. Per outpatient MD notes patient also allergic to lidocaine unknown reaction. Stroke code was initially activated and later cancelled by Dr. Knight after evaluating patient and given presentation more suspicious of seizure vs stroke. Epilepsy team was consulted and rec vEEG monitor x24hrs, to hold Modafinil, and no anti-seizure meds to be administered at this time. Epilepsy team evaluated PT in the am and reported EEG was unremarkable, she can resume her Modafinil and should follow up with outpatient neurology.     Pt is asymptomatic at this time and denies chest pain, SOB, NUNEZ, palpitations, dizziness, LOC, N/V, diaphoresis, orthopnea/PND, and leg swelling. Pt able to ambulate and void without complication. VSS. Labs and telemetry reviewed. Right radial access site stable and dressing C/D/I.  Pt is a candidate for discharge per Dr. Styles. Pt given appropriate discharge instructions, pt states they have an appropriate amount of their previous home meds unchanged from this visit at home, and any new medications were sent to their pharmacy. Pt instructed to f/u with Dr. Jesus in 1-2 weeks and with outpatient neurologist in 1-2 weeks.

## 2023-08-29 NOTE — PROGRESS NOTE ADULT - SUBJECTIVE AND OBJECTIVE BOX
# [id number]    HPI:  74 y/o Mandarin speaking female with PMH of HTN, HLD , b/l varicose veins, arthritis, sciatica who came to hospital for Coronary angiogram in view of chest pain and  CCS class II/III sx and abnormal NST. She underwent Coronary angiogram under Midazolam and Fentanyl, showed no obstruction, no intervention was performed. Following the procedure , she became unresponsive in the post op area. She started to regain consciousness in less than 10 minutes. No shaking, tongue biting, head turning, drooling, incontinence noted.   Per family, patient had similar episodes multiple times in the past specially when she is exhausted. She was seen in a facility where she had CT scan and possibly EEG done, and they were told the results were "normal". She was then seen by a Neurologist Dr. Mata, who recommended to start Lamotrigine. She took few doses and then stopped it. She was also prescribed Modafinil by her PCP.  Denies any seizure in childhood. Reports remote H/O head trauma. No family H/O seizure. per MD records patient allergic to  propofol- patient states after colonoscopy it took a long time to wake up and she was shaking her head, denies rash, itchiness, or SOB. Per outpatient MD notes patient also allergic to lidocaine unknown reaction         PAST MEDICAL & SURGICAL HISTORY:  HTN (hypertension)    HLD (hyperlipidemia)    Sciatica    Varicose veins          Medications:  BUpivacaine 0.25% Injectable 25 milliLiter(s) Local Injection once  chlorhexidine 4% Liquid 1 Application(s) Topical once  sodium chloride 0.9%. 1000 milliLiter(s) IV Continuous <Continuous>            Neurological Exam:   Mental status: awake, alert and oriented x3.  Naming, repetition and comprehension intact.  Attention/concentration intact.  No dysarthria, no aphasia.   Cranial nerves: Pupils equally round and reactive to light, visual fields full, no nystagmus, extraocular muscles intact, V1 through V3 intact bilaterally and symmetric, face symmetric,   Motor:  MRC grading 5/5 b/l UE/LE.   strength 5/5.  Normal tone and bulk.  No abnormal movements.    Sensation: Intact to light touch,no neglect.  Coordination: No dysmetria on finger-to-nose   Reflexes: 2+ in bilateral UE/LE, Plantar downgoing b/l  Gait:Deferred    Labs:  CBC Full  -  ( 28 Aug 2023 15:48 )  WBC Count : 5.06 K/uL  RBC Count : 3.71 M/uL  Hemoglobin : 11.8 g/dL  Hematocrit : 35.6 %  Platelet Count - Automated : 216 K/uL  Mean Cell Volume : 96.0 fl  Mean Cell Hemoglobin : 31.8 pg  Mean Cell Hemoglobin Concentration : 33.1 gm/dL  Auto Neutrophil # : 3.01 K/uL  Auto Lymphocyte # : 1.67 K/uL  Auto Monocyte # : 0.33 K/uL  Auto Eosinophil # : 0.02 K/uL  Auto Basophil # : 0.02 K/uL  Auto Neutrophil % : 59.5 %  Auto Lymphocyte % : 33.0 %  Auto Monocyte % : 6.5 %  Auto Eosinophil % : 0.4 %  Auto Basophil % : 0.4 %    08-28    142  |  108  |  10  ----------------------------<  104<H>  3.4<L>   |  24  |  0.44<L>    Ca    9.0      28 Aug 2023 15:48  Mg     2.0     08-28    TPro  7.1  /  Alb  4.1  /  TBili  0.4  /  DBili  x   /  AST  23  /  ALT  26  /  AlkPhos  48  08-28    LIVER FUNCTIONS - ( 28 Aug 2023 15:48 )  Alb: 4.1 g/dL / Pro: 7.1 g/dL / ALK PHOS: 48 U/L / ALT: 26 U/L / AST: 23 U/L / GGT: x           PT/INR - ( 28 Aug 2023 12:26 )   PT: 10.7 sec;   INR: 0.94          PTT - ( 28 Aug 2023 12:26 )  PTT:29.5 sec  Urinalysis Basic - ( 28 Aug 2023 15:48 )    Color: x / Appearance: x / SG: x / pH: x  Gluc: 104 mg/dL / Ketone: x  / Bili: x / Urobili: x   Blood: x / Protein: x / Nitrite: x   Leuk Esterase: x / RBC: x / WBC x   Sq Epi: x / Non Sq Epi: x / Bacteria: x

## 2023-08-29 NOTE — DISCHARGE NOTE PROVIDER - NSDCFUADDAPPT_GEN_ALL_CORE_FT
**Phone # for Dr. Jesus's office to schedule an appointment: [phone]     **Please ask Dr. Jesus at your next appointment for a neurology follow up recommendation within the [facility] network.

## 2023-08-29 NOTE — DISCHARGE NOTE PROVIDER - NSDCFUADDINST_GEN_ALL_CORE_FT
ACTIVITY:     Do not drive or operate hazardous machinery for 24 hours.                           Limit your physical activities for 24 hours.                           Do not engage in in sports, heavy work or heavy lifting for 72 hours.           DIET:             You may resume your regular diet.                           Drinking extra fluids (water, juice) is encouraged.                           Abstain from alcohol for 24 hours.           HYGIENE:     Shower and take off the puncture site dressing the next morning.                           If you received a "closure device" in your groin, you may shower the next day, but not take a bath, hot tub or swim for 5  days.           PAIN MEDICATION:   A mild pain at the puncture site is not unusual.                                           You may take Tylenol 1-2 tabs every 4-6 hours as needed, for one day.                                           If the pain persists contact the office at [phone]           SPECIAL INSTRUCTIONS:     Signs and symptoms to look out for:              1. Sebastian bleeding from the puncture site is an emergency.               Put direct pressure on the site and go directly to your local Emergency   Room for treatment.              2. Bleeding under the skin may also occur and a small "black and blue may be expected.            If there appears to be an expanding mass or swelling around the puncture site, apply manual compression and go             immediately to your local Emergency Room for treatment.              3. If your foot/leg or hand/arm (puncture site) becomes cool or blue and/or you are unable to move it, this must be treated as an   emergency.            go directly to your local emergency room for treatment.              4. Excessive puncture site pain is abnormal and should be assessed.             5.  Look out for signs of infection in the puncture site: fever, red streaking of the leg, discharge, pain.             6.  Lack of adequate urine output, provided you are drinking enough fluids, may be cause for concern, notify us if you think this is the case.

## 2023-08-29 NOTE — DISCHARGE NOTE NURSING/CASE MANAGEMENT/SOCIAL WORK - NSDCFUADDAPPT_GEN_ALL_CORE_FT
**Phone # for Dr. Jesus's office to schedule an appointment: [phone]     **Please ask Dr. Jesus at your next appointment for a neurology follow up recommendation within the [facility] network.

## 2023-08-29 NOTE — DISCHARGE NOTE PROVIDER - CARE PROVIDER_API CALL
Jamin Jesus  Cardiology  [address] [place] [address] [id number]  [place] [address]  Phone: [phone]  Fax: [phone]  Established Patient  Follow Up Time: 2 weeks

## 2023-09-05 DIAGNOSIS — Q24.5 MALFORMATION OF CORONARY VESSELS: ICD-10-CM

## 2023-09-05 DIAGNOSIS — I25.10 ATHEROSCLEROTIC HEART DISEASE OF NATIVE CORONARY ARTERY WITHOUT ANGINA PECTORIS: ICD-10-CM

## 2023-09-05 DIAGNOSIS — I10 ESSENTIAL (PRIMARY) HYPERTENSION: ICD-10-CM

## 2023-09-05 DIAGNOSIS — M19.90 UNSPECIFIED OSTEOARTHRITIS, UNSPECIFIED SITE: ICD-10-CM

## 2023-09-05 DIAGNOSIS — E78.5 HYPERLIPIDEMIA, UNSPECIFIED: ICD-10-CM
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
## 2023-09-05 DIAGNOSIS — T41.3X5A ADVERSE EFFECT OF LOCAL ANESTHETICS, INITIAL ENCOUNTER: ICD-10-CM

## 2023-09-05 PROBLEM — Z00.00 ENCOUNTER FOR PREVENTIVE HEALTH EXAMINATION: Status: ACTIVE | Noted: 2023-09-05

## 2024-11-22 NOTE — DISCHARGE NOTE NURSING/CASE MANAGEMENT/SOCIAL WORK - NSDCPEFALRISK_GEN_ALL_CORE
For information on Fall & Injury Prevention, visit: [email or web address] OR  [email or web address] OR  https://www.cdc.gov/steadi/patient.html
No